# Patient Record
Sex: FEMALE | Race: WHITE | URBAN - METROPOLITAN AREA
[De-identification: names, ages, dates, MRNs, and addresses within clinical notes are randomized per-mention and may not be internally consistent; named-entity substitution may affect disease eponyms.]

---

## 2022-08-24 ENCOUNTER — HOME HEALTH ADMISSION (OUTPATIENT)
Dept: HOME HEALTH SERVICES | Facility: HOME HEALTH | Age: 62
End: 2022-08-24
Payer: COMMERCIAL

## 2022-08-25 ENCOUNTER — HOME CARE VISIT (OUTPATIENT)
Dept: SCHEDULING | Facility: HOME HEALTH | Age: 62
End: 2022-08-25
Payer: COMMERCIAL

## 2022-08-25 VITALS
TEMPERATURE: 98.4 F | HEART RATE: 64 BPM | SYSTOLIC BLOOD PRESSURE: 112 MMHG | DIASTOLIC BLOOD PRESSURE: 60 MMHG | RESPIRATION RATE: 15 BRPM | OXYGEN SATURATION: 99 %

## 2022-08-25 PROCEDURE — G0151 HHCP-SERV OF PT,EA 15 MIN: HCPCS

## 2022-08-25 PROCEDURE — 400013 HH SOC

## 2022-08-25 NOTE — Clinical Note
[address]  [birth year]  [id number]  Lalitha left THR     Bandage to be removed and left open to air 8/31/22     Returns to [facility] 9/9/22     [employer] insurance      2 w 1  3 w 1  2 w 2

## 2022-08-26 ENCOUNTER — HOME CARE VISIT (OUTPATIENT)
Dept: SCHEDULING | Facility: HOME HEALTH | Age: 62
End: 2022-08-26
Payer: COMMERCIAL

## 2022-08-26 VITALS
OXYGEN SATURATION: 96 % | TEMPERATURE: 97.3 F | DIASTOLIC BLOOD PRESSURE: 64 MMHG | HEART RATE: 60 BPM | SYSTOLIC BLOOD PRESSURE: 116 MMHG

## 2022-08-26 PROCEDURE — G0157 HHC PT ASSISTANT EA 15: HCPCS

## 2022-08-26 NOTE — HOME HEALTH
S: patient reports that she feels like she is doing well - she reports that she had her right hip replaced via the posterior approach in the past  O: PAIN: patient is taking pain medication on schedule, educated patient on use of ice for pain and edema  patient to apply ice to the left hip for pain and swelling control per MD protocol using a barrier to protect the skin. Patient to monitor the skin for any adverse effects such as color changes, irritation, mottled appearance. Patient to use ice for 20 minutes an hour for the first 2-3 days and then can reduce to 20 minutes 4-5 times a day. WOUND:L hip covered in the mepilex bandage  - wound not visible. surrouding the bandage there is moderate swelling without redness. per MD orders did not remove the bandage but educated patient on signs of infection and to NOT remove the bandage   dressing on left hip to be removed on POD 7 (8/31/22)  ROM: L hip flexion in standing 35 degrees   L hip flexion in supine heel slide 62 degrees   STRENGTH: R knee ext 5/5, R knee flexion 5/5, R hip flex 4+/5, R hip abd/adduction 4+/5  L knee flexion 3+/5 and extension 3+/5, L hip flexion 2-/5 abduction and adduction 2-/5  BED MOBILITY: patient requires min to moderate assist to lift the L LE in and out of the bed due to weakness and pain. EQUIPMENT: FWW, SPC, bath chair  TRANSFERS: patient completed sit to stand from the bed, chair and the toilet with exaggerated MATT, maintenance of the L LE extended out in front of her, full weight shift to the right LE to completely unweight the L LE. Requires use of walker for initial stance for balance, safety and to unweight the L LE.   GAIT: ambulating with FWW with SBA for 50 feet x 2 with step through continuous pattern with asymmetrical step length left greater than right, mild L hip hiking for L LE swing/advancement, slow leroy   STEPS: patient completed 2 steps from the main living area of the home in/out of her bedroom (no railing on these steps) - she completed with SBA and cues for hand placement and sequencing   BALANCE: griselda 14/28 high fall risk  RESPONSE TO TREATMENT:patient demonstrated a positive outcome post treatment and reported a reduction in pain, increased comfort and increased confidence with transfers and mobility. Patient reported good understanding of the HEP and reports confidence in ability to complete the program as prescribed by PT  RESPONSE TO EDUCATION: patient was educated on: safety, HEP, signs of infection  Patient expressed understanding of all education provided and was able to repeat back education, precautions, and HEP. A:ASSESSMENT AND PROGRESS TOWARD GOALS:  Patient demonstrated a positive result to therapy this date as evidenced by an improvement in gait pattern with cues, decreased pain with exercise and walking and patient expressing an understanding of the rehab plan due to therapy verbal and written instructions. Goals established for increased independence in the home, safe mobility in the home, improvement in strength and ROM - all designed to reduce fall risk and progress toward independence. Patient will benefit from continued PT intervention to progress toward meeting all established goals    P:.continue with progression of mobility, ther ex for strength, ROM, provide education to patient and caregivers to maximize the recovery and reduce the fall risk to progress toward a return to independent function    Skilled services/Home bound verification: MD referral for HHPT following recent hospital stay.  HHPT is medically necessary to address  dx and clinical findings: decreased ROM, decreased strength, impaired gait, decreased ability w stair negotiation, increased swelling, decreased bed mobility, decreased transfer status, decreased endurance, decreased balance and decreased safety, increased pain in order to improve functional mobility/quality of life, decrease burden of care, reduce risk for re-hospitalization, work towards patient's personal goals of return to PLOF w decrease risk for falls. Skilled Reason for admission/summary of clinical condition:  s/p L THR by Dr Vikram Mendoza: patient lives in a 2 story house with her  - there are 5 steps to enter and egress the home via the garage and front doors and patient's bed and full bathroom are on the second floor of the house. Her  is the main caregiver and assists with meals, medications, ADLs, mobility and transportation     Medications reconciled and all medications are available in the home this visit. The following education was provided regarding medications, medication interactions, and look a like medications: Meds are effective at this time. no discrepancies noted  patient was educated on risks and side effects of tramadol and the need to follow the prescription as written by the MD  Opioids/Narcotics:  Instructed patient/caregiver to take exact amount prescribed, signs and symptoms of oversedation, notify SN/PT if over sedated. May cause constipation, notify SN/PT if no BM x 3 days. Home health supplies ordered/delivered this visit include: none    Patient education provided: safety, fall risk, HEP, signs of infection. Patient/caregiver degree of understanding:good    Home exercise program: supine ankle pumps, quad sets, heel slides, SAQ  seated LAQ, isometric hip adduction   walking every hour during waking hours   ice at least 4 times a day for 20 minutes, more if needed    Pt/Caregiver instructed on plan of care and are agreeable to plan of care      Plan of care called to attending MD: Dr Melissa Sosa written copy of the [facility] of Rights was given and verbally reviewed on this visit. . The patient or representative was given the opportunity to ask pertinent questions regarding the bill of rights.   [employer] of rights information was received by patient and her     Discharge planning discussed with patient and caregiver. DC to self and family under MD supervision when all goals are met or maximum potential is reached. Pt/Caregiver did verbalize understanding of DC planning. Next MD appointment 9/20/22 with Dr Carolina Miles . Patient/caregiver instructed to keep appointment as lack of follow through with MD appointment could result in discontinuation of home care services for non-compliance.

## 2022-08-26 NOTE — HOME HEALTH
SUBJECTIVE: The pt states she is had a BM yesterday and a small BM today. The pt states she is to follow posterior THR precautions. [place] DATE OF SURGERY: 8/24/22 L THR with Dr. Patrick Han  . PAIN: see pain assessment  OBJECTIVE: see interventions  . NEXT MD APPT: 9/20/22  . CAREGIVER ASSISTANCE NEEDED FOR: The pt lives with her  who assists with care as needed: medication management, shopping, transportation, meals, safety. .  ASSESSMENT AND PROGRESS TOWARD GOALS:  The pt presents with increased pain in which is controlled with medications as needed, ice and rest and increased edema with decreased strength and ROM of the L hip requiring the use of the RW for all mobility at this time. She is 2 days post THR with expected mobility at this time. She will benefit from continued HHPT to increase strength and ROM for improved mobility and gait progressing her to safe household mobility with the [facility] and the safe ability to exit the home to return to community ambulation. PATIENT RESPONSE TO TREATMENT: no increase in pain post activity. PATIENT LEVEL OF UNDERSTANDING OF EDUCATION: Good - the pt able to teach back the HEP with the use of the handouts. .  CONTINUED NEED FOR THE FOLLOWING SKILLS: HH PT is medically necessary to address pain, decreased ROM, decreased strength, increased swelling, impaired bed mobility, decreased independence with functional transfers, impaired gait, impaired stair negotiation, and impaired balance in order to improve functional independence, quality of life, return to PLOF, reduce the risk for falls, and reduce pain. Edi LAST COMPLETED ON:  Lino Perez, PT 8/26/22  . PLAN: Plan to review and progress the HEP next visit with standing ther exs on the evan LEs promoting FWBing on the LLE, if tolerated, to prepare for weaning to the cane.    DISCHARGE PLANNING DISCUSSED: Discharge to self and family under MD supervision once all goals have been met or patient has reached maximum potential. Discussed with the pt the POC to continue HHPT with the remaining frequency of 3w1, 2w2 unless goals met, will DC early. The pt is in agreement to the POC at this time.

## 2022-08-29 ENCOUNTER — HOME CARE VISIT (OUTPATIENT)
Dept: SCHEDULING | Facility: HOME HEALTH | Age: 62
End: 2022-08-29
Payer: COMMERCIAL

## 2022-08-29 VITALS
DIASTOLIC BLOOD PRESSURE: 68 MMHG | TEMPERATURE: 97.8 F | SYSTOLIC BLOOD PRESSURE: 116 MMHG | OXYGEN SATURATION: 97 % | HEART RATE: 82 BPM

## 2022-08-29 PROCEDURE — G0157 HHC PT ASSISTANT EA 15: HCPCS

## 2022-08-29 NOTE — HOME HEALTH
SUBJECTIVE: \"I think I'm doing great. \" The pt reports compliance with her HEP and icing regimen. States she is having regular BMs and has not had narcotics in 2 days. DATE OF SURGERY: 8/24/22 L THR with Dr. Jamel Hsieh  . PAIN: see pain assessment    OBJECTIVE: see interventions  . NEXT MD APPT: 9/20/22  . CAREGIVER ASSISTANCE NEEDED FOR: The pt lives with her  who assists with care as needed: medication management, shopping, transportation, meals, safety. .  ASSESSMENT AND PROGRESS TOWARD GOALS: The pt continues to make progression towards HHPT goals with an active hip flex ROM on the L hip in standing to 76 degrees. Verbal reports indicate improved pain management without the use of narcotics. She continues to present with decreased strength and balance requiring S with ambulation with the STC, but is ambulating well with the RW with symmetrical stepping pattern and full foot clearance on both inside and outside surfaces. She will benefit from continued HHPT to progress strength, balance and endurance to ensure safety and reduce her fall risks with the return to community ambulation. PATIENT RESPONSE TO TREATMENT: no increase in pain post activity. PATIENT LEVEL OF UNDERSTANDING OF EDUCATION: Good - the pt able to teach back the HEP with the use of the handouts. PLAN: plan to review the HEP and ambulation outside with the [facility], next visit. DISCHARGE PLANNING DISCUSSED: Discharge to self and family under MD supervision once all goals have been met or patient has reached maximum potential. Discussed with the pt the POC to continue HHPT with the remaining frequency of x 2 visits this week then 2w2 unless goals met, will DC early. The pt is in agreement to the POC at this time.

## 2022-08-31 ENCOUNTER — HOME CARE VISIT (OUTPATIENT)
Dept: SCHEDULING | Facility: HOME HEALTH | Age: 62
End: 2022-08-31
Payer: COMMERCIAL

## 2022-08-31 VITALS — TEMPERATURE: 98.6 F | SYSTOLIC BLOOD PRESSURE: 106 MMHG | DIASTOLIC BLOOD PRESSURE: 72 MMHG

## 2022-08-31 PROCEDURE — G0157 HHC PT ASSISTANT EA 15: HCPCS

## 2022-09-01 ENCOUNTER — HOME CARE VISIT (OUTPATIENT)
Dept: HOME HEALTH SERVICES | Facility: HOME HEALTH | Age: 62
End: 2022-09-01
Payer: COMMERCIAL

## 2022-09-01 NOTE — HOME HEALTH
SUBJECTIVE: The pt states she continues to progress. Taking tylenol only for pain management with rest and ice. DATE OF SURGERY: 8/24/22 L THR with Dr. Katia Still  . PAIN: see pain assessment    OBJECTIVE: see interventions  . NEXT MD APPT: 9/20/22  . CAREGIVER ASSISTANCE NEEDED FOR: The pt lives with her  who assists with care as needed: medication management, shopping, transportation, meals, safety. .  ASSESSMENT AND PROGRESS TOWARD GOALS: The pt demonstrated a positive result in HHPT on this date with evidence of an improved Tinetti Balance test of 23/28 on this date improved from a 14/28 upon initial evaluation. The pt is progressing with ambulation, but continues to require S with the [address] [place] [address] on outside surfaces due to fatigue and slight guarding creating some instability and fall risks. The pt will benefit from continued HHPT to continue to progress strength, balance, and endurance to return her to safe community activiites. PATIENT RESPONSE TO TREATMENT: no increase in pain post activity. PATIENT LEVEL OF UNDERSTANDING OF EDUCATION: Good - the pt able to teach back the HEP with the use of the handouts. PLAN: Plan to progress the LE strengthening program with the addition of step ups next visit. DISCHARGE PLANNING DISCUSSED: Discharge to self and family under MD supervision once all goals have been met or patient has reached maximum potential. Discussed with the pt the POC to continue HHPT with the remaining frequency of x 1 visit this week then 2w2 unless goals met, will DC early. The pt is in agreement to the POC at this time.

## 2022-09-02 ENCOUNTER — HOME CARE VISIT (OUTPATIENT)
Dept: SCHEDULING | Facility: HOME HEALTH | Age: 62
End: 2022-09-02
Payer: COMMERCIAL

## 2022-09-02 VITALS
SYSTOLIC BLOOD PRESSURE: 120 MMHG | DIASTOLIC BLOOD PRESSURE: 72 MMHG | HEART RATE: 70 BPM | TEMPERATURE: 98.1 F | OXYGEN SATURATION: 99 %

## 2022-09-02 PROCEDURE — G0157 HHC PT ASSISTANT EA 15: HCPCS

## 2022-09-02 NOTE — HOME HEALTH
SUBJECTIVE: The pt is reporting continued compliance with the HEP. PT EDUCATION: Education for golgarcia's lift to safely pick items off the floor. DATE OF SURGERY: 8/24/22 L THR with Dr. Brittany Rubi  . PAIN: see pain assessment  OBJECTIVE: see interventions  . NEXT MD APPT: 9/20/22  . CAREGIVER ASSISTANCE NEEDED FOR: The pt lives with her  who assists with care as needed: medication management, shopping, transportation, meals, safety. .  ASSESSMENT AND PROGRESS TOWARD GOALS: The pt continues to progress towards the resolution of HHPT goals. She is now ambulating with the STC, mod(I) on various outside surfaces. The pt does have c/o fatigue throughout and post PT. She will benefit from continued HHPT to progress the HEP and educate on self progression of the HEP, but may be ready for an early DC. PATIENT RESPONSE TO TREATMENT: improved safety and stability with picking items off the floor post education. PATIENT LEVEL OF UNDERSTANDING OF EDUCATION: Good - the pt able to teach back the HEP with the use of the handouts. PLAN: Plan to address ambulation in the home without an AD, next visit. DISCHARGE PLANNING DISCUSSED: Discharge to self and family under MD supervision once all goals have been met or patient has reached maximum potential. Discussed with the pt the POC to continue HHPT with the remaining frequency of  2w2 unless goals met, will DC early. The pt is in agreement to the POC at this time.

## 2022-09-06 ENCOUNTER — HOME CARE VISIT (OUTPATIENT)
Dept: SCHEDULING | Facility: HOME HEALTH | Age: 62
End: 2022-09-06
Payer: COMMERCIAL

## 2022-09-06 PROCEDURE — G0157 HHC PT ASSISTANT EA 15: HCPCS

## 2022-09-07 VITALS
HEART RATE: 67 BPM | SYSTOLIC BLOOD PRESSURE: 112 MMHG | TEMPERATURE: 98 F | DIASTOLIC BLOOD PRESSURE: 68 MMHG | OXYGEN SATURATION: 98 %

## 2022-09-07 NOTE — HOME HEALTH
SUBJECTIVE: \"I think I'm doing great. \"    DATE OF SURGERY: 8/24/22 L THR with Dr. Donna La  . PAIN: see pain assessment    OBJECTIVE: see interventions  . NEXT MD APPT: 9/20/22  . CAREGIVER ASSISTANCE NEEDED FOR: The pt lives with her  who assists with care as needed: medication management, shopping, transportation, meals, safety. .  ASSESSMENT AND PROGRESS TOWARD GOALS: The pt continues to make progress towards HHPT goals as she is now beginning to wean from the [facility]. She is not safe to ambulate without the STC at this time due to noted guarding and fear with altered gait mechanics. The pt remains tender to palpation, but reports only icing approx 2 times per day. Suggested she increase the frequency to 4-5 times for 20 mins keeping a barrier between the ice and skin. The pt is progressing nicely towards HHPT goals at the [facility] level. Will continue to progress the education on self progression of the HEP as the pt reports lack of interest to go to outpatient PT. PATIENT RESPONSE TO TREATMENT: no increase in pain, no LOB with training with ambulation without the use of an AD, but signficantly guarded mvmts. PATIENT LEVEL OF UNDERSTANDING OF EDUCATION: Good - the pt able to teach back the HEP with the use of the handouts. PLAN: plan to review the HEP next visit     DISCHARGE PLANNING DISCUSSED: Discharge to self and family under MD supervision once all goals have been met or patient has reached maximum potential. Discussed with the pt the POC to continue HHPT with the remaining frequency of  1 more visit this week then 2w1 unless goals met, will DC early. The pt is in agreement to the POC at this time.

## 2022-09-08 ENCOUNTER — HOME CARE VISIT (OUTPATIENT)
Dept: SCHEDULING | Facility: HOME HEALTH | Age: 62
End: 2022-09-08
Payer: COMMERCIAL

## 2022-09-08 PROCEDURE — G0157 HHC PT ASSISTANT EA 15: HCPCS

## 2022-09-08 NOTE — Clinical Note
thank you :)    ----- Message -----  From: Annabel Sol PTA  Sent: 9/9/2022  12:04 PM EDT  To: Hallie Snyder PT      The pt has been participating in [address]  since 8/25/22 following a L THR. She has progressed nicely towards the resolution of HHPT goals and is ready for HHPT DC and is reporting well controlled pain with ice, rest and tylenol. ROM:  80 degrees active L hip flex in standing   Bed Mobility: (I)  Transfers: mod(I) with the STC  Gait: The pt is mod(I) with ambulation using the STC with symmetrical stepping pattern and full foot clearance on outside surfaces. the pt is progressing towards ambulation withour the use of an AD.    Stairs: Mod(I) with the unilateral HR and STC  Special Tests: Tinetti Balance test 25/28  Recommendations: continued compliance with the HEP, the pt reports she is not interested in transitioning to outpatient PT.

## 2022-09-09 VITALS — DIASTOLIC BLOOD PRESSURE: 64 MMHG | HEART RATE: 68 BPM | SYSTOLIC BLOOD PRESSURE: 122 MMHG | OXYGEN SATURATION: 99 %

## 2022-09-09 NOTE — CASE COMMUNICATION
The pt has been participating in [address]  since 8/25/22 following a L THR. She has progressed nicely towards the resolution of HHPT goals and is ready for HHPT DC and is reporting well controlled pain with ice, rest and tylenol. ROM:  80 degrees active L hip flex in standing   Bed Mobility: (I)  Transfers: mod(I) with the STC  Gait: The pt is mod(I) with ambulation using the STC with symmetrical stepping pattern and full foot clearance  on outside surfaces. the pt is progressing towards ambulation withour the use of an AD.    Stairs: Mod(I) with the unilateral HR and STC  Special Tests: Tinetti Balance test 25/28  Recommendations: continued compliance with the HEP, the pt reports she is not interested in transitioning to outpatient PT.

## 2022-09-09 NOTE — HOME HEALTH
SUBJECTIVE: The pt answered the door without the use of an AD. She states she has been working on walking without the cane, but finds herself limping. [place] DATE OF SURGERY: 8/24/22 L THR with Dr. Brittany Rubi  . PAIN: see pain assessment  OBJECTIVE: see interventions  . NEXT MD APPT: 9/20/22  . CAREGIVER ASSISTANCE NEEDED FOR: The pt lives with her  who assists with care as needed: medication management, shopping, transportation, meals, safety. .  ASSESSMENT AND PROGRESS TOWARD GOALS: The pt has been participating in [address] since 8/25/22 following a L THR. She has progressed nicely towards the resolution of HHPT goals and is ready for HHPT DC and is reporting well controlled pain with ice, rest and tylenol. ROM:  80 degrees active L hip flex in standing   Bed Mobility: (I)  Transfers: mod(I) with the STC  Gait: The pt is mod(I) with ambulation using the STC with symmetrical stepping pattern and full foot clearance on outside surfaces. the pt is progressing towards ambulation withour the use of an AD. Stairs: Mod(I) with the unilateral HR and STC  Special Tests: Tinetti Balance test 25/28  Recommendations: continued compliance with the HEP, the pt reports she is not interested in transitioning to outpatient PT. PATIENT RESPONSE TO TREATMENT: Good technique with ther exs    PATIENT LEVEL OF UNDERSTANDING OF EDUCATION: Good - the pt able to teach back the HEP with the use of the handouts. PLAN: Reassessment with anticipated DC with Rod Rodriguez PT next visit. DISCHARGE PLANNING DISCUSSED: Discharge to self and family under MD supervision once all goals have been met or patient has reached maximum potential. Discussed with the pt the POC to continue HHPT with the remaining frequency of 1 more HHPT visit for reassessment and anticipated DC at that time. The pt is in agreement to the POC at this time.

## 2022-09-12 ENCOUNTER — HOME CARE VISIT (OUTPATIENT)
Dept: SCHEDULING | Facility: HOME HEALTH | Age: 62
End: 2022-09-12
Payer: COMMERCIAL

## 2022-09-12 VITALS
OXYGEN SATURATION: 96 % | TEMPERATURE: 98 F | SYSTOLIC BLOOD PRESSURE: 126 MMHG | DIASTOLIC BLOOD PRESSURE: 76 MMHG | RESPIRATION RATE: 16 BRPM | HEART RATE: 64 BPM

## 2022-09-12 PROCEDURE — G0151 HHCP-SERV OF PT,EA 15 MIN: HCPCS

## 2022-09-12 NOTE — HOME HEALTH
S: patient reports that she feels like she is doing well but still uses the cane outside and when she is tired      O: PAIN: patient is taking OTC pain medication only, educated patient on use of ice for pain and edema  patient to apply ice to the left hip for pain and swelling control per MD protocol using a barrier to protect the skin. Patient to monitor the skin for any adverse effects such as color changes, irritation, mottled appearance. Patient to use ice for 20 minutes 4-5 times a day as needed  may alos use heat to the \"tight\" feeling areas on the left thigh (quads/adductors and abductors) for 20 minutes at a time with a barrier to prevent burns- patient to use on a low setting and monitor skin frequenrly while in use and after use    WOUND:L hip incision open to air with prineo tape covering it - starting to peel up on the edges  no signs of infection noted or reported - edges are well approximated with re-epithelialization      ROM: L hip flexion in standing 35 degrees AT EVALUATION  L hip flexion in supine heel slide 62 degrees AT EVALUATION  L hip flexion 85 degrees AT DISCHARGE    STRENGTH: R knee ext 5/5, R knee flexion 5/5, R hip flex 4+/5, R hip abd/adduction 4+/5  L knee flexion 3+/5 and extension 3+/5, L hip flexion 2-/5 abduction and adduction 2-/5 AT EVALUATION  l knee flexion 4+/5 and extension 4+/5, L hip flexion 3-/5 abduction and adduction 3-/5 AT DISCHARGE    BED MOBILITY: patient is independent with all bed mobility     EQUIPMENT: SPC, bath chair    TRANSFERS: patient is independent with all transfers in and out of the home     GAIT/STEPS: She is walking without a device on even surface and with a single point cane with modified independence on uneven surfaces with symmetrical pattern and good safety awareness.  She is able to egress and enter her house independently via steps at the front door and garage door    BALANCE: tinetti 14/28 high fall risk AT EVALUATION  tinetti 28/28 AT DISCHARGE    RESPONSE TO TREATMENT:patient demonstrated a positive outcome post treatment and reported a reduction in pain, increased comfort and increased confidence with transfers and mobility. Patient reported good understanding of the HEP and reports confidence in ability to complete the program as prescribed by PT    RESPONSE TO EDUCATION: patient was educated on: safety, HEP, signs of infection  Patient expressed understanding of all education provided and was able to repeat back education, precautions, and HEP. A:ASSESSMENT AND PROGRESS TOWARD GOALS:  Patient has been seen for home care PT following a L THR by Dr Ebony Ash. She has made great progress in all areas. She in independent with all ADLs at this time and all transfers in and out of the home. She is walking without a device on even surface and with a single point cane with modified independence on uneven surfaces with symmetrical pattern and good safety awareness. She is able to egress and enter her house independently via steps at the front door and garage door. Current range of motion is L hip flexion 85 degrees in standing. She is independent with her HEP and consistent with follow through. She will discuss transition to outpatient therapy at her follow up with Dr Ebony Ash and is being discharged from home care PT today. P: DC PT       Skilled services/Home bound verification: MD referral for HHPT following recent hospital stay. HHPT is medically necessary to address  dx and clinical findings: decreased ROM, decreased strength, impaired gait, decreased ability w stair negotiation, increased swelling, decreased bed mobility, decreased transfer status, decreased endurance, decreased balance and decreased safety, increased pain in order to improve functional mobility/quality of life, decrease burden of care, reduce risk for re-hospitalization, work towards patient's personal goals of return to PLOF w decrease risk for falls.          Skilled Reason for admission/summary of clinical condition:  s/p L THR by Dr Jayne Samuel: patient lives in a 2 story house with her  - there are 5 steps to enter and egress the home via the garage and front doors and patient's bed and full bathroom are on the second floor of the house. Her  is the main caregiver and assists with transportation     Medications reconciled and all medications are available in the home this visit. The following education was provided regarding medications, medication interactions, and look a like medications: Meds are effective at this time. no discrepancies noted    Patient education provided: safety, fall risk, HEP, signs of infection. Patient/caregiver degree of understanding:good    HEP:1) daily walking program with the STC, increasing distance as tolerated.   2) ice as instructed   3) ther exs 2 times per day   supine: APs, QS/GS, heel slides, SAQs, hip abd x 20   standing: (with evan UE support): calf raises, LLE/RLE hip abd with unilateral HR x 20 with 10 end range pulses, LLE/RLE HS curls with unilateral UE support, hip flex with unilateral UE support, mini squats x 20   sit to stand x 5   RSL clam shells x 20   step ups x 10 each   updated written instructions of the HEP issued to the pt

## 2022-09-12 NOTE — Clinical Note
Patient has been seen for home care PT following a L THR by Dr Uriel Delcid. She has made great progress in all areas. She in independent with all ADLs at this time and all transfers in and out of the home. She is walking without a device on even surface and with a single point cane with modified independence on uneven surfaces with symmetrical pattern and good safety awareness. She is able to egress and enter her house independently via steps at the front door and garage door. Current range of motion is L hip flexion 85 degrees in standing. She is independent with her HEP and consistent with follow through. She will discuss transition to outpatient therapy at her follow up with Dr Uriel Delcid and is being discharged from home care PT today.